# Patient Record
Sex: MALE | Race: WHITE | NOT HISPANIC OR LATINO | Employment: FULL TIME | URBAN - METROPOLITAN AREA
[De-identification: names, ages, dates, MRNs, and addresses within clinical notes are randomized per-mention and may not be internally consistent; named-entity substitution may affect disease eponyms.]

---
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
## 2023-04-02 ENCOUNTER — APPOINTMENT (OUTPATIENT)
Dept: CT IMAGING | Facility: CLINIC | Age: 18
End: 2023-04-02
Attending: EMERGENCY MEDICINE
Payer: COMMERCIAL

## 2023-04-02 ENCOUNTER — HOSPITAL ENCOUNTER (EMERGENCY)
Facility: CLINIC | Age: 18
Discharge: HOME | End: 2023-04-02
Attending: EMERGENCY MEDICINE | Admitting: EMERGENCY MEDICINE
Payer: COMMERCIAL

## 2023-04-02 VITALS
DIASTOLIC BLOOD PRESSURE: 77 MMHG | OXYGEN SATURATION: 99 % | BODY MASS INDEX: 17.05 KG/M2 | HEART RATE: 74 BPM | TEMPERATURE: 98 F | WEIGHT: 118.83 LBS | RESPIRATION RATE: 16 BRPM | SYSTOLIC BLOOD PRESSURE: 124 MMHG

## 2023-04-02 DIAGNOSIS — R10.31 RLQ ABDOMINAL PAIN: ICD-10-CM

## 2023-04-02 LAB
ALBUMIN SERPL BCG-MCNC: 5 G/DL (ref 3.5–5.2)
ALBUMIN UR-MCNC: 20 MG/DL
ALP SERPL-CCNC: 99 U/L (ref 40–129)
ALT SERPL W P-5'-P-CCNC: 13 U/L (ref 10–50)
ANION GAP SERPL CALCULATED.3IONS-SCNC: 14 MMOL/L (ref 7–15)
APPEARANCE UR: CLEAR
AST SERPL W P-5'-P-CCNC: 20 U/L (ref 10–50)
BASOPHILS # BLD AUTO: 0 10E3/UL (ref 0–0.2)
BASOPHILS NFR BLD AUTO: 1 %
BILIRUB SERPL-MCNC: 0.9 MG/DL
BILIRUB UR QL STRIP: NEGATIVE
BUN SERPL-MCNC: 10.6 MG/DL (ref 6–20)
CALCIUM SERPL-MCNC: 10.1 MG/DL (ref 8.6–10)
CHLORIDE SERPL-SCNC: 103 MMOL/L (ref 98–107)
COLOR UR AUTO: YELLOW
CREAT SERPL-MCNC: 0.72 MG/DL (ref 0.67–1.17)
DEPRECATED HCO3 PLAS-SCNC: 25 MMOL/L (ref 22–29)
EOSINOPHIL # BLD AUTO: 0.2 10E3/UL (ref 0–0.7)
EOSINOPHIL NFR BLD AUTO: 3 %
ERYTHROCYTE [DISTWIDTH] IN BLOOD BY AUTOMATED COUNT: 12.9 % (ref 10–15)
GFR SERPL CREATININE-BSD FRML MDRD: >90 ML/MIN/1.73M2
GLUCOSE SERPL-MCNC: 100 MG/DL (ref 70–99)
GLUCOSE UR STRIP-MCNC: NEGATIVE MG/DL
HCT VFR BLD AUTO: 46 % (ref 40–53)
HGB BLD-MCNC: 15.7 G/DL (ref 13.3–17.7)
HGB UR QL STRIP: NEGATIVE
HOLD SPECIMEN: NORMAL
HOLD SPECIMEN: NORMAL
IMM GRANULOCYTES # BLD: 0 10E3/UL
IMM GRANULOCYTES NFR BLD: 0 %
KETONES UR STRIP-MCNC: 10 MG/DL
LEUKOCYTE ESTERASE UR QL STRIP: NEGATIVE
LYMPHOCYTES # BLD AUTO: 2.3 10E3/UL (ref 0.8–5.3)
LYMPHOCYTES NFR BLD AUTO: 32 %
MCH RBC QN AUTO: 29 PG (ref 26.5–33)
MCHC RBC AUTO-ENTMCNC: 34.1 G/DL (ref 31.5–36.5)
MCV RBC AUTO: 85 FL (ref 78–100)
MONOCYTES # BLD AUTO: 0.6 10E3/UL (ref 0–1.3)
MONOCYTES NFR BLD AUTO: 8 %
MUCOUS THREADS #/AREA URNS LPF: PRESENT /LPF
NEUTROPHILS # BLD AUTO: 4 10E3/UL (ref 1.6–8.3)
NEUTROPHILS NFR BLD AUTO: 56 %
NITRATE UR QL: NEGATIVE
NRBC # BLD AUTO: 0 10E3/UL
NRBC BLD AUTO-RTO: 0 /100
PH UR STRIP: 6 [PH] (ref 5–7)
PLATELET # BLD AUTO: 228 10E3/UL (ref 150–450)
POTASSIUM SERPL-SCNC: 3.8 MMOL/L (ref 3.4–5.3)
PROT SERPL-MCNC: 8 G/DL (ref 6.4–8.3)
RBC # BLD AUTO: 5.42 10E6/UL (ref 4.4–5.9)
RBC URINE: 1 /HPF
SODIUM SERPL-SCNC: 142 MMOL/L (ref 136–145)
SP GR UR STRIP: 1.02 (ref 1–1.03)
UROBILINOGEN UR STRIP-MCNC: NORMAL MG/DL
WBC # BLD AUTO: 7.2 10E3/UL (ref 4–11)
WBC URINE: 1 /HPF

## 2023-04-02 PROCEDURE — 36415 COLL VENOUS BLD VENIPUNCTURE: CPT | Performed by: EMERGENCY MEDICINE

## 2023-04-02 PROCEDURE — 81003 URINALYSIS AUTO W/O SCOPE: CPT | Performed by: EMERGENCY MEDICINE

## 2023-04-02 PROCEDURE — 85025 COMPLETE CBC W/AUTO DIFF WBC: CPT | Performed by: EMERGENCY MEDICINE

## 2023-04-02 PROCEDURE — 96375 TX/PRO/DX INJ NEW DRUG ADDON: CPT

## 2023-04-02 PROCEDURE — 99285 EMERGENCY DEPT VISIT HI MDM: CPT | Mod: 25

## 2023-04-02 PROCEDURE — 250N000011 HC RX IP 250 OP 636: Performed by: EMERGENCY MEDICINE

## 2023-04-02 PROCEDURE — 80053 COMPREHEN METABOLIC PANEL: CPT | Performed by: EMERGENCY MEDICINE

## 2023-04-02 PROCEDURE — 96374 THER/PROPH/DIAG INJ IV PUSH: CPT

## 2023-04-02 PROCEDURE — 74177 CT ABD & PELVIS W/CONTRAST: CPT

## 2023-04-02 PROCEDURE — 250N000009 HC RX 250: Performed by: EMERGENCY MEDICINE

## 2023-04-02 PROCEDURE — 250N000013 HC RX MED GY IP 250 OP 250 PS 637: Performed by: EMERGENCY MEDICINE

## 2023-04-02 RX ORDER — ACETAMINOPHEN 500 MG
1000 TABLET ORAL ONCE
Status: COMPLETED | OUTPATIENT
Start: 2023-04-02 | End: 2023-04-02

## 2023-04-02 RX ORDER — ONDANSETRON 2 MG/ML
4 INJECTION INTRAMUSCULAR; INTRAVENOUS ONCE
Status: COMPLETED | OUTPATIENT
Start: 2023-04-02 | End: 2023-04-02

## 2023-04-02 RX ORDER — KETOROLAC TROMETHAMINE 15 MG/ML
15 INJECTION, SOLUTION INTRAMUSCULAR; INTRAVENOUS ONCE
Status: COMPLETED | OUTPATIENT
Start: 2023-04-02 | End: 2023-04-02

## 2023-04-02 RX ORDER — IOPAMIDOL 755 MG/ML
500 INJECTION, SOLUTION INTRAVASCULAR ONCE
Status: COMPLETED | OUTPATIENT
Start: 2023-04-02 | End: 2023-04-02

## 2023-04-02 RX ADMIN — KETOROLAC TROMETHAMINE 15 MG: 15 INJECTION, SOLUTION INTRAMUSCULAR; INTRAVENOUS at 12:57

## 2023-04-02 RX ADMIN — IOPAMIDOL 60 ML: 755 INJECTION, SOLUTION INTRAVENOUS at 13:02

## 2023-04-02 RX ADMIN — SODIUM CHLORIDE 55 ML: 9 INJECTION, SOLUTION INTRAVENOUS at 13:02

## 2023-04-02 RX ADMIN — ONDANSETRON 4 MG: 2 INJECTION INTRAMUSCULAR; INTRAVENOUS at 12:54

## 2023-04-02 RX ADMIN — ACETAMINOPHEN 1000 MG: 500 TABLET ORAL at 12:54

## 2023-04-02 ASSESSMENT — ENCOUNTER SYMPTOMS
VOMITING: 0
NAUSEA: 1
FEVER: 0
ABDOMINAL PAIN: 1
HEMATURIA: 0
APPETITE CHANGE: 1
DYSURIA: 0
BLOOD IN STOOL: 0
DIARRHEA: 0

## 2023-04-02 ASSESSMENT — ACTIVITIES OF DAILY LIVING (ADL): ADLS_ACUITY_SCORE: 35

## 2023-04-02 NOTE — ED TRIAGE NOTES
Onset of constant sharp RLQ abdominal pain x1 week ago. Nausea without vomiting. Last BM yesterday, states it was normal but caused increased pain in his stomach. Pt states abdominal pain has moved to the left side and RUQ at times but is mostly localized to the RLQ. No urinary sx. No fevers. ABCs intact. VSS

## 2023-04-02 NOTE — DISCHARGE INSTRUCTIONS
Tylenol or warm pack as needed for pain.  Avoid ibuprofen which can be very hard on your stomach.  Continue antacids.  Arrange follow-up with GI.  Return immediately if you have worsening symptoms or new concerns of any kind.

## 2023-04-02 NOTE — ED PROVIDER NOTES
History     Chief Complaint:  Abdominal Pain     The history is provided by the patient and a parent.      Jaime Taylor is a 17 year old male with a history of anxiety and GERD who presents with abdominal pain. He describes 7/10 right lower quadrant abdominal pain for 1 week, worsening over night and becoming more sharp in quality. He endorses nausea and decreased appetite without vomiting, fever, diarrhea, dysuria, or hematuria. He took no analgesics prior to arrival. He denies similar pain in the past. However, review of medical records indicates he has been seen as an outpatient in the last month for reflux and weight loss with outpatient CT which was unremarkable. His mother expresses concern for chronic appendicitis. She also notes his B12 level has been high and is concerned for malignancy.     Independent Historian: see above    Review of External Notes: I reviewed CT abd/pelvis from 3/17/23 with no acute findings. Patient referred to have neurology consultation and endoscopy consultation after office visit 3/17/23 with primary care physician for weight loss and syncope.     ROS:  Review of Systems   Constitutional: Positive for appetite change and unexpected weight change (per chart review). Negative for fever.   Gastrointestinal: Positive for abdominal pain and nausea. Negative for blood in stool, diarrhea and vomiting.   Genitourinary: Negative for dysuria and hematuria.   All other systems reviewed and are negative.    Allergies:  No Known Allergies     Medications:    Lexapro   Albuterol   Prilosec     Past Medical History:    Anxiety   GERD     Social History:  Presents with his mother.   Employed.    Physical Exam     Patient Vitals for the past 24 hrs:   BP Temp Temp src Pulse Resp SpO2 Weight   04/02/23 1425 124/77 -- -- 74 16 99 % --   04/02/23 1004 (!) 134/98 98  F (36.7  C) Oral 98 18 98 % 53.9 kg (118 lb 13.3 oz)      Physical Exam  General: Well-developed and thin. Well appearing young   man. Cooperative.  Head:  Atraumatic.  Eyes:  Conjunctivae, lids, and sclerae are normal.  Neck:  Supple. Normal range of motion.  CV:  Regular rate and rhythm. Normal heart sounds with no murmurs, rubs, or gallops detected.  Resp:  No respiratory distress. Clear to auscultation bilaterally without decreased breath sounds, wheezing, rales, or rhonchi.  GI:  Soft. Non-distended. Right lower quadrant tenderness.    MS:  Normal ROM.   Skin:  Warm. Non-diaphoretic. No pallor.  Neuro:  Awake. A&Ox3. Normal strength.  Psych: Normal mood and affect. Normal speech.  Vitals reviewed.    Emergency Department Course   Imaging:  CT Abdomen Pelvis w Contrast   Final Result   IMPRESSION:    1.  Small amount of free fluid in the pelvis.   2.  Thickening of the wall of the bladder.         Report per radiology    Laboratory:  Labs Ordered and Resulted from Time of ED Arrival to Time of ED Departure   ROUTINE UA WITH MICROSCOPIC REFLEX TO CULTURE - Abnormal       Result Value    Color Urine Yellow      Appearance Urine Clear      Glucose Urine Negative      Bilirubin Urine Negative      Ketones Urine 10 (*)     Specific Gravity Urine 1.025      Blood Urine Negative      pH Urine 6.0      Protein Albumin Urine 20 (*)     Urobilinogen Urine Normal      Nitrite Urine Negative      Leukocyte Esterase Urine Negative      Mucus Urine Present (*)     RBC Urine 1      WBC Urine 1     COMPREHENSIVE METABOLIC PANEL - Abnormal    Sodium 142      Potassium 3.8      Chloride 103      Carbon Dioxide (CO2) 25      Anion Gap 14      Urea Nitrogen 10.6      Creatinine 0.72      Calcium 10.1 (*)     Glucose 100 (*)     Alkaline Phosphatase 99      AST 20      ALT 13      Protein Total 8.0      Albumin 5.0      Bilirubin Total 0.9      GFR Estimate >90     CBC WITH PLATELETS AND DIFFERENTIAL    WBC Count 7.2      RBC Count 5.42      Hemoglobin 15.7      Hematocrit 46.0      MCV 85      MCH 29.0      MCHC 34.1      RDW 12.9      Platelet  "Count 228      % Neutrophils 56      % Lymphocytes 32      % Monocytes 8      % Eosinophils 3      % Basophils 1      % Immature Granulocytes 0      NRBCs per 100 WBC 0      Absolute Neutrophils 4.0      Absolute Lymphocytes 2.3      Absolute Monocytes 0.6      Absolute Eosinophils 0.2      Absolute Basophils 0.0      Absolute Immature Granulocytes 0.0      Absolute NRBCs 0.0        Emergency Department Course & Assessments:  Interventions:  Medications   ondansetron (ZOFRAN) injection 4 mg (4 mg Intravenous $Given 4/2/23 1254)   ketorolac (TORADOL) injection 15 mg (15 mg Intravenous $Given 4/2/23 1257)   acetaminophen (TYLENOL) tablet 1,000 mg (1,000 mg Oral $Given 4/2/23 1254)      Assessments:  1210 I obtained history and examined the patient as noted above.     1408 I returned to check on patient. We discussed findings and plan of care. Patient and mother are comfortable with discharge and outpatient GI follow up.     Independent Interpretation (X-rays, CTs, rhythm strip):  I personally reviewed patient's CT imaging. I agree with radiologist interpretation.     Consultations/Discussion of Management or Tests:  N/A    Social Determinants of Health affecting care:   Supportive mother.   Employed.     Disposition:  The patient was discharged.     Impression & Plan    Medical Decision Making:  Jaime is a 17 year old boy who has been struggling with unintentional weight loss which is being worked up as an outpatient including negative CT of the abdomen and pelvis 3/17/2023.  He is presenting with 1 week of right lower quadrant abdominal pain that seem to worsen last night.  He has associated nausea and poor appetite though no fever or other concerns.    On exam he is thin, but well-appearing.  He has mild right lower quadrant tenderness.  I have very low suspicion for appendicitis as his symptoms have been present for 1 week.  His mother expresses concern for \"chronic appendicitis\" although this is unlikely given a " negative CT chest 2 weeks ago. To practice with an abundance of caution, CT was performed. Fortunately, CT reveals only thickening of the wall of the bladder.  This is unlikely be clinically relevant as urinalysis is without UTI or hematuria.  There is no kidney injury, electrolyte derangement, hepatitis, biliary obstruction, leukocytosis, or anemia.    He was treated with Zofran, Toradol, and Tylenol.  He and his mother are relieved that he does not require emergent treatment today and agree to follow-up with GI as he does have endoscopy already scheduled.  I recommended use of Tylenol for pain with avoidance of NSAIDs given his history of reflux for which he is already taking antacids.  Indications for return were discussed.  All questions answered.    Diagnosis:    ICD-10-CM    1. RLQ abdominal pain  R10.31            Scribe Disclosure:  I, Analilia Moon, am serving as a scribe at 12:02 PM on 4/2/2023 to document services personally performed by Emily Bhakta MD based on my observations and the provider's statements to me.     4/2/2023   Emily Bhakta MD Dixson, Kylie S, MD  04/16/23 8750

## 2023-04-16 ASSESSMENT — ENCOUNTER SYMPTOMS: UNEXPECTED WEIGHT CHANGE: 1

## 2024-08-09 ENCOUNTER — HOSPITAL ENCOUNTER (EMERGENCY)
Facility: CLINIC | Age: 19
Discharge: HOME | End: 2024-08-10
Attending: SOCIAL WORKER | Admitting: SOCIAL WORKER

## 2024-08-09 ENCOUNTER — APPOINTMENT (OUTPATIENT)
Dept: GENERAL RADIOLOGY | Facility: CLINIC | Age: 19
End: 2024-08-09
Attending: SOCIAL WORKER

## 2024-08-09 DIAGNOSIS — J93.9 PNEUMOTHORAX ON LEFT: ICD-10-CM

## 2024-08-09 LAB
ANION GAP SERPL CALCULATED.3IONS-SCNC: 14 MMOL/L (ref 7–15)
BASOPHILS # BLD AUTO: 0.1 10E3/UL (ref 0–0.2)
BASOPHILS NFR BLD AUTO: 1 %
BUN SERPL-MCNC: 8.4 MG/DL (ref 6–20)
CALCIUM SERPL-MCNC: 9.6 MG/DL (ref 8.8–10.4)
CHLORIDE SERPL-SCNC: 99 MMOL/L (ref 98–107)
CREAT SERPL-MCNC: 0.8 MG/DL (ref 0.67–1.17)
D DIMER PPP FEU-MCNC: <0.27 UG/ML FEU (ref 0–0.5)
EGFRCR SERPLBLD CKD-EPI 2021: >90 ML/MIN/1.73M2
EOSINOPHIL # BLD AUTO: 0.1 10E3/UL (ref 0–0.7)
EOSINOPHIL NFR BLD AUTO: 2 %
ERYTHROCYTE [DISTWIDTH] IN BLOOD BY AUTOMATED COUNT: 12.7 % (ref 10–15)
GLUCOSE SERPL-MCNC: 130 MG/DL (ref 70–99)
HCO3 SERPL-SCNC: 23 MMOL/L (ref 22–29)
HCT VFR BLD AUTO: 44.1 % (ref 40–53)
HGB BLD-MCNC: 14.7 G/DL (ref 13.3–17.7)
HOLD SPECIMEN: NORMAL
HOLD SPECIMEN: NORMAL
IMM GRANULOCYTES # BLD: 0 10E3/UL
IMM GRANULOCYTES NFR BLD: 0 %
LYMPHOCYTES # BLD AUTO: 2.2 10E3/UL (ref 0.8–5.3)
LYMPHOCYTES NFR BLD AUTO: 25 %
MCH RBC QN AUTO: 28.8 PG (ref 26.5–33)
MCHC RBC AUTO-ENTMCNC: 33.3 G/DL (ref 31.5–36.5)
MCV RBC AUTO: 86 FL (ref 78–100)
MONOCYTES # BLD AUTO: 0.6 10E3/UL (ref 0–1.3)
MONOCYTES NFR BLD AUTO: 6 %
NEUTROPHILS # BLD AUTO: 5.9 10E3/UL (ref 1.6–8.3)
NEUTROPHILS NFR BLD AUTO: 67 %
NRBC # BLD AUTO: 0 10E3/UL
NRBC BLD AUTO-RTO: 0 /100
PLATELET # BLD AUTO: 183 10E3/UL (ref 150–450)
POTASSIUM SERPL-SCNC: 3.7 MMOL/L (ref 3.4–5.3)
RBC # BLD AUTO: 5.11 10E6/UL (ref 4.4–5.9)
SODIUM SERPL-SCNC: 136 MMOL/L (ref 135–145)
TROPONIN T SERPL HS-MCNC: <6 NG/L
WBC # BLD AUTO: 8.9 10E3/UL (ref 4–11)

## 2024-08-09 PROCEDURE — 71046 X-RAY EXAM CHEST 2 VIEWS: CPT | Mod: 76

## 2024-08-09 PROCEDURE — 80048 BASIC METABOLIC PNL TOTAL CA: CPT | Performed by: SOCIAL WORKER

## 2024-08-09 PROCEDURE — 85025 COMPLETE CBC W/AUTO DIFF WBC: CPT | Performed by: SOCIAL WORKER

## 2024-08-09 PROCEDURE — 84484 ASSAY OF TROPONIN QUANT: CPT | Performed by: SOCIAL WORKER

## 2024-08-09 PROCEDURE — 93005 ELECTROCARDIOGRAM TRACING: CPT

## 2024-08-09 PROCEDURE — 85379 FIBRIN DEGRADATION QUANT: CPT | Performed by: SOCIAL WORKER

## 2024-08-09 PROCEDURE — 36415 COLL VENOUS BLD VENIPUNCTURE: CPT | Performed by: SOCIAL WORKER

## 2024-08-09 PROCEDURE — 71046 X-RAY EXAM CHEST 2 VIEWS: CPT

## 2024-08-09 PROCEDURE — 99285 EMERGENCY DEPT VISIT HI MDM: CPT | Mod: 25

## 2024-08-09 ASSESSMENT — ACTIVITIES OF DAILY LIVING (ADL)
ADLS_ACUITY_SCORE: 35

## 2024-08-09 ASSESSMENT — COLUMBIA-SUICIDE SEVERITY RATING SCALE - C-SSRS
2. HAVE YOU ACTUALLY HAD ANY THOUGHTS OF KILLING YOURSELF IN THE PAST MONTH?: NO
6. HAVE YOU EVER DONE ANYTHING, STARTED TO DO ANYTHING, OR PREPARED TO DO ANYTHING TO END YOUR LIFE?: NO
1. IN THE PAST MONTH, HAVE YOU WISHED YOU WERE DEAD OR WISHED YOU COULD GO TO SLEEP AND NOT WAKE UP?: NO

## 2024-08-09 NOTE — ED PROVIDER NOTES
"  Emergency Department Note      History of Present Illness     Chief Complaint   Chest Pain      HPI   Jaime Taylor is a 19 year old male with history of heartburn who presents with worsening of chest pain that has been present over the past week.  Patient reports that the pain is located primarily over the left side and up into his left shoulder.  Breathing worsens the pain, nothing makes better.  He describes as a stabbing sensation.  Has never felt this before.  Denies any fever, coughing, numbness or tingling.  Denies any medical problems for which he takes medications.  No personal history of blood clots, no early heart attacks in family members before age 50.  Denies tobacco use but notes that he vapes, denies any other drug use.  No trauma to the chest.    Independent Historian   None    Review of External Notes   None    Past Medical History     Medical History and Problem List   History of heartburn   Anxiety    Medications   Lexapro   Prilosec  Roxicodone   Pepcid     Physical Exam     Patient Vitals for the past 24 hrs:   BP Temp Temp src Pulse Resp SpO2 Height Weight   08/10/24 0018 -- -- -- -- -- 100 % -- --   08/10/24 0017 114/75 -- -- 53 -- 100 % -- --   08/09/24 2301 116/72 -- -- 53 -- 98 % -- --   08/09/24 2230 107/81 -- -- 54 -- 99 % -- --   08/09/24 2202 112/69 -- -- 51 -- 100 % -- --   08/09/24 2132 114/76 -- -- 55 -- 99 % -- --   08/09/24 2112 116/66 -- -- -- -- 99 % -- --   08/09/24 2102 113/72 -- -- 59 -- 99 % -- --   08/09/24 2042 115/78 -- -- -- -- 99 % -- --   08/09/24 2032 116/78 -- -- 63 -- 100 % -- --   08/09/24 2018 -- -- -- -- -- 100 % -- --   08/09/24 2015 111/79 -- -- 68 -- -- -- --   08/09/24 1948 108/70 -- -- 62 13 98 % -- --   08/09/24 1907 101/70 -- -- 53 -- 99 % -- --   08/09/24 1832 128/83 -- -- 91 11 -- -- --   08/09/24 1801 (!) 142/93 97.9  F (36.6  C) Temporal 113 18 100 % 1.854 m (6' 1\") 52.5 kg (115 lb 11.9 oz)     Physical Exam  General: Taking deep breaths and " appears uncomfortable  HEENT: Conjunctivae clear, no scleral icterus, mucous membranes moist  Neuro: Alert, moving all extremities equally with intention  CV: Regular rate and rhythm, radial and DP pulses equal  Respiratory: Breath sounds equal bilaterally   Abdomen: Soft, without rigidity or rebound throughout  MSK: No lower extremity swelling or tenderness    Diagnostics     Lab Results   Labs Ordered and Resulted from Time of ED Arrival to Time of ED Departure - No data to display    Imaging   Chest XR,  PA & LAT   Final Result   Addendum (preliminary) 1 of 1   CLINICAL ADDENDUM:    Clinical information in this report has been modified from the previous    version as follows:      Upon additional review, there is a subtle 3 mm left apical pneumothorax.    No mediastinal shift. No displaced left rib fractures are identified.      These findings were discussed by phone with Dr. Phillips at 7:43 PM on 8/9/2024.      END ADDENDUM      Final   IMPRESSION:       Lungs are clear. No evidence of pneumonia. No pleural effusions or pneumothorax. Normal pulmonary vascularity. Nonenlarged cardiac silhouette.          EKG   EKG 1806  Sinus rhythm with likely LVH, no signs of acute ischemia  Rate 89  QRS 86 QTc 425    Independent Interpretation   CXR shows left-sided small apical pneumothorax, called radiology    ED Course      Medications Administered   Medications - No data to display    Procedures   Procedures     Discussion of Management   None  Radiology:  Dr. Link    ED Course   ED Course as of 08/10/24 1355   Fri Aug 09, 2024   1820 I obtained history and examined the patient as noted above.     1942 D-Dimer Quantitative: <0.27   Sat Aug 10, 2024   0002 Reassessed patient, he is comfortable.        Additional Documentation  None    Medical Decision Making / Diagnosis     CMS Diagnoses: None    MIPS       None    [facility]   Jaime Taylor is a 19 year old male who presents to the ED with chief complaint of chest pain that  is pleuritic.  Stable overall on examination and patient with bilateral breath sounds.  EKG nonischemic, troponin entirely negative.  Low risk by [place] criteria and D-dimer was negative doubt PE.  Chest x-ray with a very small apical pneumothorax, size <3cm. This is a spontaneous pneumothorax as he has had no trauma. Patient was placed on oxy mask 100% and observed in the emergency department repeat chest x-ray over 4 hours later which did not demonstrate any interval change.  Patient had significant improvement in his pain.  With the small size, clinical stability, and patient and father are reliable to return, I think it is reasonable for him to discharge home.  They will return to the ER immediately if he has worsening pain, difficulty breathing, shortness of breath syncope.  Counseled to avoid any changes in pressure such as flying and to avoid vaping.  I have placed a primary care referral for next week for urgent follow-up.  Patient and father are comfortable with the plan.  Discharged in stable condition.    Disposition   The patient was discharged.     Diagnosis     ICD-10-CM    1. Pneumothorax on left  J93.9            Discharge Medications   New Prescriptions    No medications on file         Scribe Disclosure:  Nidhi AVILA, am serving as a scribe at 6:12 PM on 8/9/2024 to document services personally performed by Margaret Barnes MD based on my observations and the provider's statements to me.        Margaret Barnes MD  08/10/24 [id number]

## 2024-08-09 NOTE — ED TRIAGE NOTES
Pt states that he has had chest pain for the past week.  He states that today it became unbearable and comes in for assessment.  He states that the pain is sharp in nature on the left side of the chest.  He states his pain 10/10.  Pt states that he vapes and he thinks that it may be a lung issue.       Triage Assessment (Adult)       Row Name 08/09/24 3218          Triage Assessment    Airway WDL WDL        Respiratory WDL    Respiratory WDL X;rhythm/pattern        Cardiac WDL    Cardiac WDL X;chest pain

## 2024-08-10 VITALS
HEIGHT: 73 IN | HEART RATE: 53 BPM | RESPIRATION RATE: 13 BRPM | SYSTOLIC BLOOD PRESSURE: 114 MMHG | BODY MASS INDEX: 15.34 KG/M2 | OXYGEN SATURATION: 100 % | TEMPERATURE: 97.9 F | WEIGHT: 115.74 LBS | DIASTOLIC BLOOD PRESSURE: 75 MMHG

## 2024-08-10 NOTE — DISCHARGE INSTRUCTIONS
You were seen in the emergency department for chest pain.  The chest x-ray here showed that you had a very small pneumothorax.  We observed you for several hours and the repeat chest x-ray showed that it was stable.     I am putting in a referral for primary care doctor checkup next week. Do not fly in a plane. Avoid vaping or smoking.    If you go home, and if you have return of chest pain, difficulty breathing, feeling short of breath, please come back to the emergency department.

## 2024-08-12 LAB
ATRIAL RATE - MUSE: 89 BPM
DIASTOLIC BLOOD PRESSURE - MUSE: NORMAL MMHG
INTERPRETATION ECG - MUSE: NORMAL
P AXIS - MUSE: 83 DEGREES
PR INTERVAL - MUSE: 138 MS
QRS DURATION - MUSE: 86 MS
QT - MUSE: 350 MS
QTC - MUSE: 425 MS
R AXIS - MUSE: 80 DEGREES
SYSTOLIC BLOOD PRESSURE - MUSE: NORMAL MMHG
T AXIS - MUSE: 76 DEGREES
VENTRICULAR RATE- MUSE: 89 BPM